# Patient Record
Sex: FEMALE | Race: ASIAN | NOT HISPANIC OR LATINO | URBAN - METROPOLITAN AREA
[De-identification: names, ages, dates, MRNs, and addresses within clinical notes are randomized per-mention and may not be internally consistent; named-entity substitution may affect disease eponyms.]

---

## 2023-01-17 ENCOUNTER — EMERGENCY (EMERGENCY)
Facility: HOSPITAL | Age: 53
LOS: 1 days | Discharge: HOME | End: 2023-01-17
Attending: EMERGENCY MEDICINE
Payer: MEDICAID

## 2023-01-17 VITALS
RESPIRATION RATE: 18 BRPM | HEART RATE: 68 BPM | WEIGHT: 115.08 LBS | DIASTOLIC BLOOD PRESSURE: 73 MMHG | HEIGHT: 63 IN | SYSTOLIC BLOOD PRESSURE: 138 MMHG | OXYGEN SATURATION: 98 %

## 2023-01-17 PROCEDURE — 99284 EMERGENCY DEPT VISIT MOD MDM: CPT

## 2023-01-17 RX ORDER — ACETAMINOPHEN 500 MG
975 TABLET ORAL ONCE
Refills: 0 | Status: COMPLETED | OUTPATIENT
Start: 2023-01-17 | End: 2023-01-17

## 2023-01-17 RX ORDER — TETANUS TOXOID, REDUCED DIPHTHERIA TOXOID AND ACELLULAR PERTUSSIS VACCINE, ADSORBED 5; 2.5; 8; 8; 2.5 [IU]/.5ML; [IU]/.5ML; UG/.5ML; UG/.5ML; UG/.5ML
0.5 SUSPENSION INTRAMUSCULAR ONCE
Refills: 0 | Status: COMPLETED | OUTPATIENT
Start: 2023-01-17 | End: 2023-01-17

## 2023-01-17 NOTE — ED PROVIDER NOTE - NSFOLLOWUPINSTRUCTIONS_ED_ALL_ED_FT
There were no signs of an emergency medical condition on completion of today's workup.  You will need further medical care and evaluation. A presumptive diagnosis of facial abrasion and head injury has been made, however further evaluation may be required by your primary care doctor or specialist for a definitive diagnosis.      Follow up with your medical doctor in 2-3 days or call our clinic at [phone] and state you were seen in the Emergency Department and would like to be seen in clinic. You may also call [phone] to speak with a representative to assist follow up care with medicine, surgery, or specialists.    If you are having pain, take Tylenol/acetaminophen 1 g every six hours and supplement (if allowed by your physician, and if you're not having gastric/gastrointestinal/stomach/intestinal problems) with ibuprofen 600 mg, with food or milk/maalox, every six hours which can be taken three hours apart from the Tylenol to have a layered effect.     Be sure to take no more than 4000mg or 4g of Tylenol/acetaminophen in a 24 hour period. Be sure to check your other medications to see if they include Tylenol/acetaminophen and include them in your calculations to ensure you do not take more than 4000mg or 4g of Tylenol/acetaminophen a day.    Drink at least 2 Liters or 64 Ounces of water each day (UNLESS you are supposed to restrict fluids or have a history of congestive heart failure (CHF)).    Return for any persistent, worsening symptoms, or ANY concerns at all.

## 2023-01-17 NOTE — ED PROVIDER NOTE - HEAD, MLM
Head is atraumatic. Head shape is symmetrical. Head is with left maxillary facial abrasion, Head shape is symmetrical.

## 2023-01-17 NOTE — ED PROVIDER NOTE - PROGRESS NOTE DETAILS
ct imaging of head and neck independently reviewed: no ich or cervical fracture to my eyes Dr. Scott's note: At the beginning of my shift, i took over care of the patient from outgoing physician with plan to follow-up final imaging results.  Imaging negative for acute injury.  Patient is safe for discharge home with supportive care, return precautions.

## 2023-01-17 NOTE — ED PROVIDER NOTE - PATIENT PORTAL LINK FT
You can access the FollowMyHealth Patient Portal offered by [facility] by registering at the following website: [email or web address]. By joining [facility]’s FollowMyHealth portal, you will also be able to view your health information using other applications (apps) compatible with our system.

## 2023-01-17 NOTE — ED PROVIDER NOTE - CLINICAL SUMMARY MEDICAL DECISION MAKING FREE TEXT BOX
Patient with left facial injury status post mechanical fall, evaluating for ich/sdh and facial fractures, xray of elbows and knees secondary to pain and strike onto concrete. Patient safe for discharge if no ich/sdh and will splint any necessary fractures prn. Tdap updated.  Patient  understands anticipatory guidance and was given strict return and follow up precautions. The patient has been informed of all concerning signs and symptoms to return to Emergency Department, the necessity to follow up with PMD/Clinic/follow up provided within 2-3 days was explained, and the patient reports understanding of above with capacity and insight if patient disposition is to be home.

## 2023-01-17 NOTE — ED PROVIDER NOTE - SKIN, MLM
Superficial abrasions to L maxillary region and R anterior patella. Otherwise skin normal color for race, warm, dry and intact. No evidence of rash.

## 2023-01-17 NOTE — ED PROVIDER NOTE - EXTREMITY EXAM
R knee with superficial abrasion to anterior patella region. +ttp anterior patella R knee and b/l olecranon process of elbows. Otherwise no obvious deformity of UE/LE b/l. Full AROM UE/LE b/l all joints with 5/5 strength. Compartment soft/compressible. Sensation intact. 2+ DP and radial pulses b/l.

## 2023-01-17 NOTE — ED PROVIDER NOTE - OBJECTIVE STATEMENT
53 yo female no reported PMHx presents to the ED complaining of left side facial pain, diffuse headache, neck pain, R knee pain, and b/l elbow pain s/p mechanical fall at 8 PM tonight.  Patient was stepping off a bus when she lost her footing on uneven even pavement, reports she fell forward onto both knees and elbows and hit her face and head on the concrete, no LOC. Got up immediately and was ambulatory on scene.  Since then has noted bilateral neck pain, diffuse headache, left-sided facial pain/pressure, as well as pain to right knee and both elbows, worse with movement.  Denies nausea/vomiting, speech/visual changes, back pain, difficulty walking, numbness/tingling, bowel/bladder incontinence, seizure activity, tongue biting, chest pain, shortness of breath, LOC, anticoagulant usage.

## 2023-01-17 NOTE — ED PROVIDER NOTE - DIFFERENTIAL DIAGNOSIS
Bebo Wakefield MD, FACEP: In this physician's medical judgement based on clinical history and physical exam: Signs and symptoms lead to differential diagnoses which include but are not limited to sdh, left maxillary fracture, knee injury Differential Diagnosis

## 2023-01-18 VITALS
RESPIRATION RATE: 17 BRPM | OXYGEN SATURATION: 99 % | HEART RATE: 61 BPM | DIASTOLIC BLOOD PRESSURE: 80 MMHG | SYSTOLIC BLOOD PRESSURE: 122 MMHG | TEMPERATURE: 98 F

## 2023-01-18 PROCEDURE — 76377 3D RENDER W/INTRP POSTPROCES: CPT

## 2023-01-18 PROCEDURE — 90715 TDAP VACCINE 7 YRS/> IM: CPT

## 2023-01-18 PROCEDURE — 70486 CT MAXILLOFACIAL W/O DYE: CPT | Mod: 26,MG

## 2023-01-18 PROCEDURE — 99284 EMERGENCY DEPT VISIT MOD MDM: CPT | Mod: 25

## 2023-01-18 PROCEDURE — 90471 IMMUNIZATION ADMIN: CPT

## 2023-01-18 PROCEDURE — 72125 CT NECK SPINE W/O DYE: CPT | Mod: 26,MG

## 2023-01-18 PROCEDURE — G1004: CPT

## 2023-01-18 PROCEDURE — 70486 CT MAXILLOFACIAL W/O DYE: CPT | Mod: MG

## 2023-01-18 PROCEDURE — 73562 X-RAY EXAM OF KNEE 3: CPT | Mod: 26,RT

## 2023-01-18 PROCEDURE — 73070 X-RAY EXAM OF ELBOW: CPT | Mod: 26,LT,RT

## 2023-01-18 PROCEDURE — 70450 CT HEAD/BRAIN W/O DYE: CPT | Mod: MG

## 2023-01-18 PROCEDURE — 72125 CT NECK SPINE W/O DYE: CPT | Mod: MG

## 2023-01-18 PROCEDURE — 73562 X-RAY EXAM OF KNEE 3: CPT

## 2023-01-18 PROCEDURE — 73070 X-RAY EXAM OF ELBOW: CPT

## 2023-01-18 PROCEDURE — 76377 3D RENDER W/INTRP POSTPROCES: CPT | Mod: 26

## 2023-01-18 PROCEDURE — 70450 CT HEAD/BRAIN W/O DYE: CPT | Mod: 26,MG

## 2023-01-18 RX ADMIN — TETANUS TOXOID, REDUCED DIPHTHERIA TOXOID AND ACELLULAR PERTUSSIS VACCINE, ADSORBED 0.5 MILLILITER(S): 5; 2.5; 8; 8; 2.5 SUSPENSION INTRAMUSCULAR at 00:05

## 2023-01-18 RX ADMIN — Medication 975 MILLIGRAM(S): at 00:05

## 2023-01-18 NOTE — ED ADULT NURSE NOTE - OBJECTIVE STATEMENT
53 y/o Female presents to ED from home with c/o fall. Pt states she was walking on the sidewalk and had a single mechanical fall onto her face and right side of her body. Fall happened around 8pm tonight. Pt was able to ambulate on the scene. Denies LOC. Pt has abrasion to left cheek and right knee along with a bruise to shin. Denies N/V, fever, CP, SOB, numbness, tingling. A&Ox4 airway patent with spontaneous unlabored breathing. 51 y/o Female presents to ED from home with c/o fall. Pt states she was walking on the sidewalk and had a single mechanical fall onto her face and right side of her body. Fall happened around 8pm tonight. Pt was able to ambulate on the scene. Denies LOC. Pt has abrasion to left cheek and right knee along with a bruise to shin. Pain is worse with movement. Denies N/V, fever, CP, SOB, numbness, tingling. A&Ox4 airway patent with spontaneous unlabored breathing. Ambulates independently with steady gait.